# Patient Record
Sex: FEMALE | Race: WHITE
[De-identification: names, ages, dates, MRNs, and addresses within clinical notes are randomized per-mention and may not be internally consistent; named-entity substitution may affect disease eponyms.]

---

## 2021-01-28 ENCOUNTER — HOSPITAL ENCOUNTER (OUTPATIENT)
Dept: HOSPITAL 76 - WFO | Age: 26
Discharge: HOME | End: 2021-01-28
Attending: OBSTETRICS & GYNECOLOGY
Payer: COMMERCIAL

## 2021-01-28 VITALS — DIASTOLIC BLOOD PRESSURE: 76 MMHG | SYSTOLIC BLOOD PRESSURE: 122 MMHG

## 2021-01-28 DIAGNOSIS — Z3A.36: ICD-10-CM

## 2021-01-28 DIAGNOSIS — O34.219: ICD-10-CM

## 2021-01-28 DIAGNOSIS — O47.03: Primary | ICD-10-CM

## 2021-01-28 LAB
C KRUSEI DNA VAG QL NAA+PROBE: NEGATIVE
CANDIDA DNA VAG QL NAA+PROBE: NEGATIVE
CLARITY UR REFRACT.AUTO: CLEAR
GLUCOSE UR QL STRIP.AUTO: NEGATIVE MG/DL
KETONES UR QL STRIP.AUTO: NEGATIVE MG/DL
NITRITE UR QL STRIP.AUTO: NEGATIVE
PH UR STRIP.AUTO: 7 PH (ref 5–7.5)
PROT UR STRIP.AUTO-MCNC: NEGATIVE MG/DL
RBC # UR STRIP.AUTO: NEGATIVE /UL
SP GR UR STRIP.AUTO: 1.01 (ref 1–1.03)
T VAGINALIS RRNA GENITAL QL PROBE: NEGATIVE
T VAGINALIS RRNA GENITAL QL PROBE: NEGATIVE
UROBILINOGEN UR QL STRIP.AUTO: (no result) E.U./DL
UROBILINOGEN UR STRIP.AUTO-MCNC: NEGATIVE MG/DL

## 2021-01-28 PROCEDURE — 99213 OFFICE O/P EST LOW 20 MIN: CPT

## 2021-01-28 PROCEDURE — 87591 N.GONORRHOEAE DNA AMP PROB: CPT

## 2021-01-28 PROCEDURE — 87801 DETECT AGNT MULT DNA AMPLI: CPT

## 2021-01-28 PROCEDURE — 87481 CANDIDA DNA AMP PROBE: CPT

## 2021-01-28 PROCEDURE — 87086 URINE CULTURE/COLONY COUNT: CPT

## 2021-01-28 PROCEDURE — 87081 CULTURE SCREEN ONLY: CPT

## 2021-01-28 PROCEDURE — 87661 TRICHOMONAS VAGINALIS AMPLIF: CPT

## 2021-01-28 PROCEDURE — 81001 URINALYSIS AUTO W/SCOPE: CPT

## 2021-01-28 PROCEDURE — 87491 CHLMYD TRACH DNA AMP PROBE: CPT

## 2021-01-28 PROCEDURE — 81003 URINALYSIS AUTO W/O SCOPE: CPT

## 2021-01-28 PROCEDURE — 96372 THER/PROPH/DIAG INJ SC/IM: CPT

## 2021-01-28 NOTE — HISTORY & PHYSICAL EXAMINATION
Prenatal Admit History





- Visit Reason


Visit Reason: Contractions





- Pregnancy


: 2


Parity: 1


Prenatal Care: positive: Other


Prenatal Risk/History: positive: Previous 


Pregnancy Complications This Pregnancy: positive: Other (IUGR in mid -pregnancy,

resolved)





- Mother's Labs


GBS: positive: Group B Step Negative





- Other Maternal History


Other Maternal History: 





Patient is a 24 yo  at 36+6 wga by patient report here with  

contractions. 





Patient reports that she was having some back pain and abdominal pain yesterday.

She was checked by her primary OB at [place], who noted that her cervix was 

soft but closed and high. She has had no LOF or VB. Endorses FM. 





She has been having more uterine irritability and contractions throughout the 

day. She presented to triage for assessment of  labor. Her initial 

cervical xam at 14:25 was closed , long, soft, and high. She was having 

contractions every 2-5 minutes that were mild to palpation. She was checked 

again an hour later and her SVE was unchanged. Contractions are increasing in 

intensity. 





Early pregnancy had been complicated by IUGR on 20 week us that has since 

resolved with EFW in the 36%ile (per patient report) at  one week ago. 


Planned on scheduled repeat . 





PMH: none





PSH: 


 for fetal distress remote from delivery, possible failure to progress.

CT 2018


[place] teeth removal 





OB HX: 


No STIs or HSV


No abnl pap smear


CS x1








SOC HX: 


Lives in [place] with  and daughter


[employer]


Patient works in childcare


No ROSALINDA








Meds/Allgy





- Home Medications


Home Medications: 


                                Ambulatory Orders











 Medication  Instructions  Recorded  Confirmed


 


Ibuprofen [Motrin] 800 mg PO Q8H PRN #30 tablet 20 


 


Ondansetron Odt [Zofran] 4 mg TL Q6H PRN #10 tablet 20 


 


Penicillin V Potassium 500 mg PO Q6HR #40 tablet 20 














- Allergies


Allergies/Adverse Reactions: 


                                    Allergies











Allergy/AdvReac Type Severity Reaction Status Date / Time


 


No Known Drug Allergies Allergy   Verified 20 12:22














Review of Systems





- Other Findings


Other Findings: 





As per HPI, otherwise remaining systems are negative. 





Physical





- Abdominal Exam


Vital Signs: 





                                        











Temp Pulse Resp BP Pulse Ox


 


 99.3 F   104 H  18   122/76   99 


 


 21 14:11  21 14:11  21 14:11  21 14:11  21 14:11











Contraction Frequency (min/apart): irreg


Contraction Intensity: positive: Mild to moderate


Uterine Resting Tone: positive: Soft





- Fetal Monitoring


Fetal Heart Rate Baseline: 135 mod olivia 15x15 accels no decels


Fetal Strip Review: positive: Category I





- Vaginal Exam


Membranes: positive: Membranes intact


Dilation (in cm): FT


Effacement (%): long


Station: positive: -3


Cervical Position: positive: Posterior





- Other Notes


Labor Progress Note/Additional Text: 





GEN: mild distres with contractions


HEENT: NCAT


CV: mild tachycardia


RESP: nl effort


ABD: gravid, S&NT/ND


PELVIC: NEFG


SVE: FT/long/high/posterior/soft


EXT: WWP, no LE edema


NEURO: A&O, normal coordination


PSYCH: appropriate affect








Plan for Labor





- Plan For Labor


Plan for Labor: 





FALSE LABOR: 





No change in SVE over 3 hours


Patient feels contractions are getting stronger


Giving a dose of BMZ 12 mg IM x1 for anticipated late  delivery vs early 

term delivery


Nifedipine 10 mg po x1 for comfort





FWB: Cat I tracing 





Reviewed possibility of prolonged latent phase vs prodromal labor pattern


No indication for delivery at this time. 


Will cont to observe patient for possible progression